# Patient Record
Sex: MALE | Race: BLACK OR AFRICAN AMERICAN | NOT HISPANIC OR LATINO | Employment: UNEMPLOYED | URBAN - METROPOLITAN AREA
[De-identification: names, ages, dates, MRNs, and addresses within clinical notes are randomized per-mention and may not be internally consistent; named-entity substitution may affect disease eponyms.]

---

## 2022-03-30 ENCOUNTER — TELEPHONE (OUTPATIENT)
Dept: ORTHOPEDICS | Facility: CLINIC | Age: 42
End: 2022-03-30
Payer: MEDICAID

## 2022-04-18 DIAGNOSIS — M25.551 RIGHT HIP PAIN: Primary | ICD-10-CM

## 2022-04-20 ENCOUNTER — HOSPITAL ENCOUNTER (OUTPATIENT)
Dept: RADIOLOGY | Facility: HOSPITAL | Age: 42
Discharge: HOME | End: 2022-04-20
Attending: NURSE PRACTITIONER
Payer: MEDICAID

## 2022-04-20 DIAGNOSIS — M25.551 RIGHT HIP PAIN: ICD-10-CM

## 2022-04-20 PROCEDURE — 73521 X-RAY EXAM HIPS BI 2 VIEWS: CPT | Mod: TC,PO

## 2022-05-06 DIAGNOSIS — M25.859 FEMORAL ACETABULAR IMPINGEMENT: Primary | ICD-10-CM

## 2022-05-06 DIAGNOSIS — M70.62 TROCHANTERIC BURSITIS OF LEFT HIP: ICD-10-CM

## 2022-05-17 ENCOUNTER — CLINICAL SUPPORT (OUTPATIENT)
Dept: REHABILITATION | Facility: HOSPITAL | Age: 42
End: 2022-05-17
Attending: NURSE PRACTITIONER
Payer: MEDICAID

## 2022-05-17 DIAGNOSIS — M25.551 RIGHT HIP PAIN: ICD-10-CM

## 2022-05-17 DIAGNOSIS — R53.1 WEAKNESS: ICD-10-CM

## 2022-05-17 DIAGNOSIS — Z74.09 DECREASED FUNCTIONAL MOBILITY AND ENDURANCE: ICD-10-CM

## 2022-05-17 DIAGNOSIS — Z55.9 SPECIAL EDUCATIONAL NEEDS: ICD-10-CM

## 2022-05-17 DIAGNOSIS — M25.552 LEFT HIP PAIN: Primary | ICD-10-CM

## 2022-05-17 DIAGNOSIS — M62.89 ABNORMAL INCREASED MUSCLE TONE: ICD-10-CM

## 2022-05-17 PROCEDURE — 97110 THERAPEUTIC EXERCISES: CPT | Mod: PN

## 2022-05-17 PROCEDURE — 97162 PT EVAL MOD COMPLEX 30 MIN: CPT | Mod: PN

## 2022-05-17 SDOH — SOCIAL DETERMINANTS OF HEALTH (SDOH): PROBLEMS RELATED TO EDUCATION AND LITERACY, UNSPECIFIED: Z55.9

## 2022-05-18 NOTE — PLAN OF CARE
[facility]   Physical Therapy Initial Evaluation     Date: 5/17/2022   Name: Sapphire Silva  Clinic Number: [id number]    Therapy Diagnosis:   Encounter Diagnoses   Name Primary?    Left hip pain Yes    Right hip pain     Weakness     Abnormal increased muscle tone     Decreased functional mobility and endurance     Special educational needs      Physician: Marcela Ramirez, INGR[facility]    Physician Orders: PT Eval and Treat   Medical Diagnosis from Referral: M25.859 (ICD-10-CM) - Femoral acetabular impingement M70.62 (ICD-10-CM) - Trochanteric bursitis of left hip   Evaluation Date: 5/17/2022  Authorization Period Expiration: 06/05/2022  Plan of Care Expiration: 07/08/2022 at 2x/wk x 6 weeks.  Progress Note Due: 06/17/2022  Visit # / Visits authorized: 1/ 1  FOTO: 1/ 3    Time In: 1:15 pm  Time Out: 2:25 pm  Total Appointment Time (timed & untimed codes): 70 minutes    Precautions: Standard  SUBJECTIVE   Date of onset: The patient reports that his current complaints are approximately six months in length.    History of current condition - Sapphire reports: that his current complaints came out of no where. He recalls no mechanism of injury. He has about a six month history of his current complaints. Originally, his right hip started hurting. He thought he had a kale problem, so he went to a chiropractor. He was set up for an 18 visit plan of care. He reports that on the 15th visit the chiropractor adjusted the left hip, and he has had left hip pain ever since. He reports the left hip pain is now worse then the right side pain. His pain persisted, so he went to an orthopedic Dr a few weeks ago. He got X-rays taken. The X-rays revealed a cyst on the right femur. Left hip and femur looked good. He got an injection in his left hip that he believes was to help the left hip bursitis that had developed. He did complete his 18 visit chiropractor plan of care as he reports that he still had  "hope he would improve. However, he did not. "She didn't even take x-rays." He was told that his left hip may have started hurting due to compensation for the previous right hip pain. Patient is left handed and left side dominant. He has had no previous hip problems and no childhood bone diseases or growth plate issues. He had not changed how he was working out at the time of the original injury. He is a , but he reports that he is having to be careful at this time. 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 He returns to [facility]. 06/04/2022.    Falls: He reports no falls.    Imaging, CLINICAL HISTORY: 41 years Male Pain for a couple of months; no trauma; no surgery; pt is ; he stated heavy wear and tear on his joints. COMPARISON: None. FINDINGS: No acute fracture or malalignment of the pelvis or hips. Small subchondral cyst along the superior lateral aspect of the right femoral head. Femoral acetabular joint spaces are maintained. Pubic symphysis and SI joints are intact. Soft tissues are unremarkable. IMPRESSION: No acute osseous abnormality. Small subchondral cyst of the right femoral head which could reflect developing osteoarthrosis/femoroacetabular impingement.     Prior Therapy: The patient has only been treated by a chiropractor. He has not received PT for this diagnosis.  Social History: Sapphire lives with his family.  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  Prior Level of Function: The patient reports that he had no limitations prior to these current complaints.  Current Level of Function: He reports that he has to do more verbal demonstrations on his job as opposed to physical demonstrations.    Pain:  Current 5/10, worst 6/10, best 5/10   Location: Left hip greater then right hip and pain runs posteriorly to sacroiliac joint area.  Description: deep, ache, throbbing, and pressure.  Aggravating Factors: Sitting " "on a hard surface,   Easing Factors: Sitting on a soft cushion or sofa and changing positions.    Patients goals: "I want to be pain free again."     Medical History:   No past medical history on file.    Surgical History:   Sapphire Silva  has no past surgical history on file.    Medications:   Sapphire currently has no medications in their medication list.    Allergies:   Review of patient's allergies indicates:  Not on File     OBJECTIVE     Hip    Impaired Hip: Left greater then Right     Structural/Postural Inspection/Palpation:       Palpation tenderness to: Tenderness to palpation to bilateral sacroiliac joints and bilateral piriformis muscles. More tenderness reported in the left side verses the right side. Bilateral anterior superior iliac spines and bilateral posterior superior iliac spines are even. Iliac crests are even. Patient appears with decreased thoracic mobility including rotation.    Tone    Muscle Left Right Comment   Piriformis Minimal Increase Minimal Increase    Quadriceps Normal Normal    Hamstrings Minimal Increase Minimal Increase    Illiopsoas Normal Normal        ROM       Hip AROM AROM Comment    Left Right          Flexion WFL* WFL*    Extension WFL* WFL*    ABduction WFL* WFL*    ADDuction WFL* WFL*    IR 30* 40*    ER 25* 30*        MMT      Hip   Comment    Left Right          Flexion 5/5 5/5    Extension 4+/5 5/5    ABduction 4+/5 5/5    ADduction 5/5 5/5    Internal Rotation 4-/5 4+/5    External Rotation 4-/5 4+/5    Glut Max 4/5 5/5                Knee Flex 5/5 5/5    Knee ext 5/5 5/5    DF 5/5 5/5               SPECIAL TESTS       Hip   Comment    Left Right          Luis Test(iliopsoas) Positive. Positive.    Elicia Test(IT band) Negative. Negative.    Trendelenburg Test(hip dysf: weak gluts) Negative. Negative.    FADIR Positive. Negative.    Fabers test(hip, lumbar,SI jt dysf) Positive. Negative.       FUNCTIONAL MOBILITY      Balance: No gross abnormalities.      Gait: " Normal        Limitation/Restriction for FOTO hip Survey    Therapist reviewed FOTO scores for Sapphire Silva on 5/17/2022.   FOTO documents entered into [employer] - see Media section.    Limitation Score: 37% limitation at evaluation.         TREATMENT     Total Treatment time (time-based codes) separate from Evaluation: 10 minutes      Sapphire received the treatments listed below:    -kneeling iliopsoas stretch with 30 second hold x 3  -groin stretch with 30 second hold x 3  -bilateral side lying with hip external rotation x 20 each side.    PATIENT EDUCATION AND HOME EXERCISES     Education provided:   -The patient received his initial written home exercise program. He returned demo to PT and was without questions.    Written Home Exercises Provided: yes. Exercises were reviewed and Sapphire was able to demonstrate them prior to the end of the session.  Sapphire demonstrated good  understanding of the education provided. See EMR under Patient Instructions for exercises provided during therapy sessions.    ASSESSMENT     Sapphire is a 41 y.o. male referred to outpatient Physical Therapy with a medical diagnosis of M25.859 (ICD-10-CM) - Femoral acetabular impingement M70.62 (ICD-10-CM) - Trochanteric bursitis of left hip. Patient presents with bilateral with left greater then right hip pain and muscle weakness and decreased functional ability and needing patient education and a custom home program.    Patient prognosis is Excellent.   Patientt will benefit from skilled outpatient Physical Therapy to address the deficits stated above and in the chart below, provide patient /family education, and to maximize patientt's level of independence.     Plan of care discussed with patient: Yes  Patient's spiritual, cultural and educational needs considered and patient is agreeable to the plan of care and goals as stated below:     Anticipated Barriers for therapy: none    Medical Necessity is demonstrated by the  following  History  Co-morbidities and personal factors that may impact the plan of care Co-morbidities:   coping style/mechanism, level of undertstanding of current condition and muscle tone and pain control.    Personal Factors:   coping style  lifestyle     moderate   Examination  Body Structures and Functions, activity limitations and participation restrictions that may impact the plan of care Body Regions:   back  lower extremities  trunk    Body Systems:    gross symmetry  strength  gross coordinated movement  motor control  motor learning  increased muscle tone and pain control    Participation Restrictions:   No restrictions noted.    Activity limitations:   Learning and applying knowledge  no deficits    General Tasks and Commands  no deficits    Communication  no deficits    Mobility  lifting and carrying objects    Self care  looking after one's health    Domestic Life  doing house work (cleaning house, washing dishes, laundry)  assisting others    Interactions/Relationships  no deficits    Life Areas  employment    Community and Social Life  community life  recreation and leisure         moderate   Clinical Presentation evolving clinical presentation with changing clinical characteristics moderate   Decision Making/ Complexity Score: moderate     Goals:    STG  Weeks/Visits Date Established  Date Met   1.Decrease max bilateral hip pain to 4/10 to improve the patient's quality of life. 3 weeks. 5/17/2022      2. Increase left hip strength 1/2 grade to improve standing activity endurance. 3 weeks. 5/17/2022      3.Decrease FOTO limitation score to <=30% to improve his job task ability. 3 weeks. 5/17/2022      4.Fair initial written home exercise program knowledge and be without questions to have carryover after discharge from PT. 3 weeks. 5/17/2022        LTG Weeks/Visits Date Established  Date Met   1.Decrease max bilateral hip pain to 2/10 to improve the patient's quality of life. 6 weeks. 5/17/2022       2. Increase left hip strength one grade from evaluation date to improve standing activity endurance. 6 weeks. 5/17/2022        3.Decrease FOTO limitation score to <=25% to improve his job task ability. 6 weeks. 5/17/2022      4.Fair initial written home exercise program knowledge and be without questions to have carryover after discharge from PT. 6 weeks. 5/17/2022      5.Decrease bilateral piriformis and hamstring muscle tone to normal to minimal increase to improve squatting ability. 6 weeks. 5/17/2022          PLAN   Plan of care Certification: 5/17/2022 to 07/08/2022.    Outpatient Physical Therapy 2 times weekly for 6 weeks to include the following interventions: Cervical/Lumbar Traction, Electrical Stimulation unattended., Manual Therapy, Moist Heat/ Ice, Neuromuscular Re-ed, Orthotic Management and Training, Patient Education, Self Care, Therapeutic Activities, Therapeutic Exercise and care by a PTA..     Buddy Issa, PT      I CERTIFY THE NEED FOR THESE SERVICES FURNISHED UNDER THIS PLAN OF TREATMENT AND WHILE UNDER MY CARE   Physician's comments:     Physician's Signature: ___________________________________________________

## 2022-05-19 ENCOUNTER — CLINICAL SUPPORT (OUTPATIENT)
Dept: REHABILITATION | Facility: HOSPITAL | Age: 42
End: 2022-05-19
Attending: NURSE PRACTITIONER
Payer: MEDICAID

## 2022-05-19 DIAGNOSIS — Z55.9 SPECIAL EDUCATIONAL NEEDS: ICD-10-CM

## 2022-05-19 DIAGNOSIS — Z74.09 DECREASED FUNCTIONAL MOBILITY AND ENDURANCE: ICD-10-CM

## 2022-05-19 DIAGNOSIS — M25.552 LEFT HIP PAIN: Primary | ICD-10-CM

## 2022-05-19 DIAGNOSIS — M25.551 RIGHT HIP PAIN: ICD-10-CM

## 2022-05-19 DIAGNOSIS — R53.1 WEAKNESS: ICD-10-CM

## 2022-05-19 DIAGNOSIS — M62.89 ABNORMAL INCREASED MUSCLE TONE: ICD-10-CM

## 2022-05-19 PROCEDURE — 97110 THERAPEUTIC EXERCISES: CPT | Mod: PN,CQ

## 2022-05-19 SDOH — SOCIAL DETERMINANTS OF HEALTH (SDOH): PROBLEMS RELATED TO EDUCATION AND LITERACY, UNSPECIFIED: Z55.9

## 2022-05-19 NOTE — PROGRESS NOTES
[facility]   Physical Therapy Treatment Note     Name: Sapphire Silva  Clinic Number: [id number]    Therapy Diagnosis:   Encounter Diagnoses   Name Primary?    Left hip pain Yes    Right hip pain     Weakness     Abnormal increased muscle tone     Decreased functional mobility and endurance     Special educational needs      Physician: Marcela Ramirez FNP-C    Visit Date: 5/19/2022    Physician Orders: PT Eval and Treat   Medical Diagnosis from Referral: M25.859 (ICD-10-CM) - Femoral acetabular impingement M70.62 (ICD-10-CM) - Trochanteric bursitis of left hip   Evaluation Date: 5/17/2022  Authorization Period Expiration: 06/05/2022  Plan of Care Expiration: 07/08/2022 at 2x/wk x 6 weeks.  Progress Note Due: 06/17/2022  Visit # / Visits authorized: 1/ 20  FOTO: 1/ 3    PTA Visit #: 1/5     Time In: 1345  Time Out: 1430  Total Billable Time: 45 minutes    SUBJECTIVE     Pt reports: minimal pain in his left hip. Experienced some anterior hip pinching with hip flexor stretch.     He was compliant with home exercise program.  Response to previous treatment: first visit post initial evaluation   Functional change: non-remarkable    Pain: 3/10  Location: left hip      OBJECTIVE     Objective Measures updated at progress report unless specified.     Treatment     Sapphire received the treatments listed below:      therapeutic exercises to develop strength, endurance, ROM, flexibility and posture for 15 minutes including:    Dorsiflexion mobilization with Black theraband x 15 repetitions   Half kneeling dorsiflexion with 10 pounds weight x 20 repetitions   Open book x 15 repetitions bilaterally   Thread needle x 15 repetitions bilaterally   Supine thoracic extension over bolster x 15 repetitions   Prone hip extension with abduction/adduction 2 x 10 repetitions bilaterally     manual therapy techniques: Joint mobilizations were applied to the: left hip for 10 minutes,  including:    Caudal glides to left hip grade III   Lateral distraction to left hip  Posterior to anterior joint mobs to left hip grade III     neuromuscular re-education activities to improve: Coordination, Kinesthetic, Sense, Proprioception and Posture for 20 minutes. The following activities were included:    Half kneeling in tandem on foam x 30 seconds bilaterally then x 1 minute bilaterally   Half kneeling in tandem on foam paloff press with green theraband x 20 repetitions bilaterally   Half kneeling in tandem on foam chops and lifts with green theraband x 15 repetitions each and bilaterally   Supine balance on foam feet together x 1 mintue  Glute bridges on the wall 3 x 30 seconds  Glute bridges on wall alternating foot lift off 2 x 10 repetitions    Focusing on preventing hip flexor activation            Patient Education and Home Exercises     Home Exercises Provided and Patient Education Provided     Education provided:   - stop exercise if he experiences symptomology in his hips    Written Home Exercises Provided: yes. Exercises were reviewed and Sapphire was able to demonstrate them prior to the end of the session.  Sapphire demonstrated good  understanding of the education provided. See EMR under Patient Instructions for exercises provided during therapy sessions    ASSESSMENT     Pt tolerated first treatment post initial evaluation well. He was able to perform all recommended there-ex without reports of provocation of pain. He responded well to manual with a reduction in anterior hip symptomology post. Todays treatment was focused on isolating transverse abdominus and glutes to improve hip strength. Patient req'd cueing to improve form and execution of several exercises as expected. He responded well to all therapist instruction with improvement noted. Patient will continue to benefit from skilled Physical Therapy to improve strength and flexibility deficits to allow patient to return to prior level of  function without pain or limitations.       Sapphire Is progressing well towards his goals.   Pt prognosis is Excellent.     Pt will continue to benefit from skilled outpatient physical therapy to address the deficits listed in the problem list box on initial evaluation, provide pt/family education and to maximize pt's level of independence in the home and community environment.     Pt's spiritual, cultural and educational needs considered and pt agreeable to plan of care and goals.     Anticipated barriers to physical therapy: none    Goals:       STG  Weeks/Visits Date Established  Date Met   1.Decrease max bilateral hip pain to 4/10 to improve the patient's quality of life. 3 weeks. 5/17/2022     In progress 5/19/2022      2. Increase left hip strength 1/2 grade to improve standing activity endurance. 3 weeks. 5/17/2022    In progress 5/19/2022      3.Decrease FOTO limitation score to <=30% to improve his job task ability. 3 weeks. 5/17/2022    In progress 5/19/2022      4.Fair initial written home exercise program knowledge and be without questions to have carryover after discharge from PT. 3 weeks. 5/17/2022    In progress 5/19/2022         LTG Weeks/Visits Date Established  Date Met   1.Decrease max bilateral hip pain to 2/10 to improve the patient's quality of life. 6 weeks. 5/17/2022    In progress 5/19/2022      2. Increase left hip strength one grade from evaluation date to improve standing activity endurance. 6 weeks. 5/17/2022       In progress 5/19/2022      3.Decrease FOTO limitation score to <=25% to improve his job task ability. 6 weeks. 5/17/2022    In progress 5/19/2022      4.Fair initial written home exercise program knowledge and be without questions to have carryover after discharge from PT. 6 weeks. 5/17/2022    In progress 5/19/2022      5.Decrease bilateral piriformis and hamstring muscle tone to normal to minimal increase to improve squatting ability. 6 weeks. 5/17/2022    In progress  5/19/2022             PLAN     Progress hip stability exercises per patients tolerance    Suze Trinidad, PTA

## 2022-05-27 ENCOUNTER — DOCUMENTATION ONLY (OUTPATIENT)
Dept: REHABILITATION | Facility: HOSPITAL | Age: 42
End: 2022-05-27

## 2022-05-27 ENCOUNTER — CLINICAL SUPPORT (OUTPATIENT)
Dept: REHABILITATION | Facility: HOSPITAL | Age: 42
End: 2022-05-27
Attending: NURSE PRACTITIONER
Payer: MEDICAID

## 2022-05-27 DIAGNOSIS — Z74.09 DECREASED FUNCTIONAL MOBILITY AND ENDURANCE: ICD-10-CM

## 2022-05-27 DIAGNOSIS — Z55.9 SPECIAL EDUCATIONAL NEEDS: ICD-10-CM

## 2022-05-27 DIAGNOSIS — R53.1 WEAKNESS: ICD-10-CM

## 2022-05-27 DIAGNOSIS — M25.552 LEFT HIP PAIN: Primary | ICD-10-CM

## 2022-05-27 DIAGNOSIS — M62.89 ABNORMAL INCREASED MUSCLE TONE: ICD-10-CM

## 2022-05-27 DIAGNOSIS — M25.551 RIGHT HIP PAIN: ICD-10-CM

## 2022-05-27 PROCEDURE — 97110 THERAPEUTIC EXERCISES: CPT | Mod: PN,CQ

## 2022-05-27 SDOH — SOCIAL DETERMINANTS OF HEALTH (SDOH): PROBLEMS RELATED TO EDUCATION AND LITERACY, UNSPECIFIED: Z55.9

## 2022-05-27 NOTE — PROGRESS NOTES
[facility]   Physical Therapy Treatment Note     Name: Sapphire Silva  Clinic Number: [id number]    Therapy Diagnosis:   Encounter Diagnoses   Name Primary?    Left hip pain Yes    Right hip pain     Weakness     Abnormal increased muscle tone     Decreased functional mobility and endurance     Special educational needs      Physician: Marcela Ramirez FNP-C    Visit Date: 5/27/2022    Physician Orders: PT Eval and Treat   Medical Diagnosis from Referral: M25.859 (ICD-10-CM) - Femoral acetabular impingement M70.62 (ICD-10-CM) - Trochanteric bursitis of left hip   Evaluation Date: 5/17/2022  Authorization Period Expiration: 06/05/2022  Plan of Care Expiration: 07/08/2022 at 2x/wk x 6 weeks.  Progress Note Due: 06/17/2022  Visit # / Visits authorized: 2/ 20  FOTO: 1/ 3    PTA Visit #: 2/5     Time In: 1200  Time Out: 1245  Total Billable Time: 45 minutes    SUBJECTIVE     Pt reports: His hips haven't been bothering him as much since his last treatment session. He reports he is experiencing more sacroiliac pain this date. Post treatment session patient verbalized a reduction in symptomology to a 1-2/10.     He was compliant with home exercise program.  Response to previous treatment: positive   Functional change: non-remarkable    Pain: 4/10  Location: left hip      OBJECTIVE     Objective Measures updated at progress report unless specified.     Treatment     Sapphire received the treatments listed below:      therapeutic exercises to develop strength, endurance, ROM, flexibility and posture for 15 minutes including:    Dorsiflexion mobilization with Black theraband x 15 repetitions   Half kneeling dorsiflexion with 10 pounds weight x 20 repetitions   Open book x 10 repetitions bilaterally add theraband next visit  Thread needle with reverse rotation x 10 repetitions bilaterally   Prone hip extension with abduction/adduction 2 x 10 repetitions bilaterally     manual therapy  techniques: Joint mobilizations were applied to the: left hip for 10 minutes, including:    Caudal glides to left hip grade III   Lateral distraction to left hip  Posterior to anterior joint mobs to left hip grade III     neuromuscular re-education activities to improve: Coordination, Kinesthetic, Sense, Proprioception and Posture for 20 minutes. The following activities were included:    +Prone glute sets with 10 lb plate on his back for biofeed back to prevent paraspinal compensation 10 seconds holds x 3 minutes   +Quadruped over physioball donkey kicks with knee flexion 2 x 10 repetitions  +standing on airex in single leg stance moving weight around his body clock wise x 5 repetitions and counter clock wise x 5 repetitions   -Standing pushing hip into wall (glute med activation on stabilizing leg) performing hip external rotation (with hip flexed to 90 degrees) against the wall with opposite leg  x 15 repetitions bilaterally      Not performed:   Half kneeling in tandem on foam x 30 seconds bilaterally then x 1 minute bilaterally   Half kneeling in tandem on foam paloff press with green theraband x 20 repetitions bilaterally   Half kneeling in tandem on foam chops and lifts with green theraband x 15 repetitions each and bilaterally   Supine balance on foam feet together x 1 mintue  Glute bridges on the wall 3 x 30 seconds  Glute bridges on wall alternating foot lift off 2 x 10 repetitions    Focusing on preventing hip flexor activation            Patient Education and Home Exercises     Home Exercises Provided and Patient Education Provided     Education provided:   - stop exercise if he experiences symptomology in his hips    Written Home Exercises Provided: yes. Exercises were reviewed and Sapphire was able to demonstrate them prior to the end of the session.  Sapphire demonstrated good  understanding of the education provided. See EMR under Patient Instructions for exercises provided during therapy  sessions    ASSESSMENT     Patient continues to respond well to mobs to improve mobility. Treatment is then focused on improving hip intrinsic and extrinsic strengthening. He notes with some ankle weakness on left lower extremity versus right lower extremity evident during single leg stance exercise. Physical Therapy will continue to benefit from skilled Physical Therapy to improve joint mobility to allow him to improve strength with activities of daily living.        Sapphire Is progressing well towards his goals.   Pt prognosis is Excellent.     Pt will continue to benefit from skilled outpatient physical therapy to address the deficits listed in the problem list box on initial evaluation, provide pt/family education and to maximize pt's level of independence in the home and community environment.     Pt's spiritual, cultural and educational needs considered and pt agreeable to plan of care and goals.     Anticipated barriers to physical therapy: none    Goals:       STG  Weeks/Visits Date Established  Date Met   1.Decrease max bilateral hip pain to 4/10 to improve the patient's quality of life. 3 weeks. 5/17/2022     In progress 5/27/2022      2. Increase left hip strength 1/2 grade to improve standing activity endurance. 3 weeks. 5/17/2022    In progress 5/27/2022      3.Decrease FOTO limitation score to <=30% to improve his job task ability. 3 weeks. 5/17/2022    In progress 5/27/2022      4.Fair initial written home exercise program knowledge and be without questions to have carryover after discharge from PT. 3 weeks. 5/17/2022    In progress 5/27/2022         LTG Weeks/Visits Date Established  Date Met   1.Decrease max bilateral hip pain to 2/10 to improve the patient's quality of life. 6 weeks. 5/17/2022    In progress 5/27/2022      2. Increase left hip strength one grade from evaluation date to improve standing activity endurance. 6 weeks. 5/17/2022       In progress 5/27/2022      3.Decrease FOTO limitation  score to <=25% to improve his job task ability. 6 weeks. 5/17/2022    In progress 5/27/2022      4.Fair initial written home exercise program knowledge and be without questions to have carryover after discharge from PT. 6 weeks. 5/17/2022    In progress 5/27/2022      5.Decrease bilateral piriformis and hamstring muscle tone to normal to minimal increase to improve squatting ability. 6 weeks. 5/17/2022    In progress 5/27/2022             PLAN     Progress hip stability exercises per patients tolerance    Suze Trinidad, PTA

## 2022-05-31 ENCOUNTER — CLINICAL SUPPORT (OUTPATIENT)
Dept: REHABILITATION | Facility: HOSPITAL | Age: 42
End: 2022-05-31
Attending: NURSE PRACTITIONER
Payer: MEDICAID

## 2022-05-31 DIAGNOSIS — M25.552 LEFT HIP PAIN: Primary | ICD-10-CM

## 2022-05-31 DIAGNOSIS — R53.1 WEAKNESS: ICD-10-CM

## 2022-05-31 DIAGNOSIS — Z55.9 SPECIAL EDUCATIONAL NEEDS: ICD-10-CM

## 2022-05-31 DIAGNOSIS — M62.89 ABNORMAL INCREASED MUSCLE TONE: ICD-10-CM

## 2022-05-31 DIAGNOSIS — M25.551 RIGHT HIP PAIN: ICD-10-CM

## 2022-05-31 DIAGNOSIS — Z74.09 DECREASED FUNCTIONAL MOBILITY AND ENDURANCE: ICD-10-CM

## 2022-05-31 PROCEDURE — 97110 THERAPEUTIC EXERCISES: CPT | Mod: PN,CQ

## 2022-05-31 SDOH — SOCIAL DETERMINANTS OF HEALTH (SDOH): PROBLEMS RELATED TO EDUCATION AND LITERACY, UNSPECIFIED: Z55.9

## 2022-05-31 NOTE — PROGRESS NOTES
"[facility]   Physical Therapy Treatment Note     Name: Sapphire Silva  Clinic Number: [id number]    Therapy Diagnosis:   Encounter Diagnoses   Name Primary?    Left hip pain Yes    Right hip pain     Weakness     Abnormal increased muscle tone     Decreased functional mobility and endurance     Special educational needs      Physician: Marcela Ramirez FNP-C    Visit Date: 5/31/2022    Physician Orders: PT Eval and Treat   Medical Diagnosis from Referral: M25.859 (ICD-10-CM) - Femoral acetabular impingement M70.62 (ICD-10-CM) - Trochanteric bursitis of left hip   Evaluation Date: 5/17/2022  Authorization Period Expiration: 06/05/2022  Plan of Care Expiration: 07/08/2022 at 2x/wk x 6 weeks.  Progress Note Due: 06/17/2022  Visit # / Visits authorized: 3/ 20  FOTO: 1/ 3    PTA Visit #: 3/5     Time In: 1300  Time Out: 1345  Total Billable Time: 45 minutes    SUBJECTIVE     Pt reports: His pain is no longer constant. It now "comes and goes." Sitting down on a hard surface will provoke pain with a little bit in his hips but mainly in his SIJ region.   He was compliant with home exercise program.  Response to previous treatment: positive   Functional change: non-remarkable    Pain: 2/10  Location: left hip      OBJECTIVE     Objective Measures updated at progress report unless specified.     Treatment     Sapphire received the treatments listed below:      therapeutic exercises to develop strength, endurance, ROM, flexibility and posture for 15 minutes including:    Dorsiflexion mobilization with Black theraband x 15 repetitions   Half kneeling dorsiflexion with 10 pounds weight x 20 repetitions   Open book x 10 repetitions bilaterally red theraband   Thread needle with reverse rotation x 10 repetitions bilaterally       manual therapy techniques: Joint mobilizations were applied to the: bilateral hips for 15 minutes, including:    Caudal glides to left hip grade III   Lateral " distraction to left hip  Posterior to anterior joint mobs to left hip grade III     neuromuscular re-education activities to improve: Coordination, Kinesthetic, Sense, Proprioception and Posture for 20 minutes. The following activities were included:    -Skaters lunge into hip hike x 15 repetitions bilaterally  -Sideling flexion into abduction 2 x 15 repetitions    -Standing pushing hip into wall (glute med activation on stabilizing leg) performing hip external rotation (with hip flexed to 90 degrees) against the wall with opposite leg  x 15 repetitions bilaterally  -deep 6 isometrics in quadruped 5 seconds holds x 15 repetitions    With focus on preventing hip flexor from turning on  -Sidelying external rotation off the mat 2 x 15 repetitions   -Sidelying plank external rotation 2 x 10 repetitions with red theraband            Patient Education and Home Exercises     Home Exercises Provided and Patient Education Provided     Education provided:   - stop exercise if he experiences symptomology in his hips    Written Home Exercises Provided: yes. Exercises were reviewed and Sapphire was able to demonstrate them prior to the end of the session.  Sapphire demonstrated good  understanding of the education provided. See EMR under Patient Instructions for exercises provided during therapy sessions    ASSESSMENT     Patient continues to respond well to mobs to improve mobility with complete reduction in symptomology and ability to sit on hard surface without return of SIJ symptomology. Focused on hip intrinsic strengthening this date with good response noted. Patient will continue to benefit from skilled Physical Therapy to improve strength and mobility to allow him to return to all activities without onset of symptomology.         Sapphire Is progressing well towards his goals.   Pt prognosis is Excellent.     Pt will continue to benefit from skilled outpatient physical therapy to address the deficits listed in the problem list  box on initial evaluation, provide pt/family education and to maximize pt's level of independence in the home and community environment.     Pt's spiritual, cultural and educational needs considered and pt agreeable to plan of care and goals.     Anticipated barriers to physical therapy: none    Goals:       STG  Weeks/Visits Date Established  Date Met   1.Decrease max bilateral hip pain to 4/10 to improve the patient's quality of life. 3 weeks. 5/17/2022     In progress 5/31/2022      2. Increase left hip strength 1/2 grade to improve standing activity endurance. 3 weeks. 5/17/2022    In progress 5/31/2022      3.Decrease FOTO limitation score to <=30% to improve his job task ability. 3 weeks. 5/17/2022    In progress 5/31/2022      4.Fair initial written home exercise program knowledge and be without questions to have carryover after discharge from PT. 3 weeks. 5/17/2022    In progress 5/31/2022         LTG Weeks/Visits Date Established  Date Met   1.Decrease max bilateral hip pain to 2/10 to improve the patient's quality of life. 6 weeks. 5/17/2022    In progress 5/31/2022      2. Increase left hip strength one grade from evaluation date to improve standing activity endurance. 6 weeks. 5/17/2022       In progress 5/31/2022      3.Decrease FOTO limitation score to <=25% to improve his job task ability. 6 weeks. 5/17/2022    In progress 5/31/2022      4.Fair initial written home exercise program knowledge and be without questions to have carryover after discharge from PT. 6 weeks. 5/17/2022    In progress 5/31/2022      5.Decrease bilateral piriformis and hamstring muscle tone to normal to minimal increase to improve squatting ability. 6 weeks. 5/17/2022    In progress 5/31/2022             PLAN     Progress hip stability exercises per patients tolerance    Suze Trinidad, PTA

## 2022-06-07 ENCOUNTER — CLINICAL SUPPORT (OUTPATIENT)
Dept: REHABILITATION | Facility: HOSPITAL | Age: 42
End: 2022-06-07
Attending: NURSE PRACTITIONER
Payer: MEDICAID

## 2022-06-07 DIAGNOSIS — M62.89 ABNORMAL INCREASED MUSCLE TONE: ICD-10-CM

## 2022-06-07 DIAGNOSIS — M25.552 LEFT HIP PAIN: Primary | ICD-10-CM

## 2022-06-07 DIAGNOSIS — Z74.09 DECREASED FUNCTIONAL MOBILITY AND ENDURANCE: ICD-10-CM

## 2022-06-07 DIAGNOSIS — M25.551 RIGHT HIP PAIN: ICD-10-CM

## 2022-06-07 DIAGNOSIS — R53.1 WEAKNESS: ICD-10-CM

## 2022-06-07 DIAGNOSIS — Z55.9 SPECIAL EDUCATIONAL NEEDS: ICD-10-CM

## 2022-06-07 PROCEDURE — 97110 THERAPEUTIC EXERCISES: CPT | Mod: PN

## 2022-06-07 SDOH — SOCIAL DETERMINANTS OF HEALTH (SDOH): PROBLEMS RELATED TO EDUCATION AND LITERACY, UNSPECIFIED: Z55.9

## 2022-06-07 NOTE — PROGRESS NOTES
"[facility]   Physical Therapy Treatment Note     Name: Sapphire Silva  Clinic Number: [id number]    Therapy Diagnosis:   Encounter Diagnoses   Name Primary?    Left hip pain Yes    Right hip pain     Weakness     Abnormal increased muscle tone     Decreased functional mobility and endurance     Special educational needs      Physician: Marcela Ramirez FNP-C    Visit Date: 6/7/2022    Physician Orders: PT Eval and Treat   Medical Diagnosis from Referral: M25.859 (ICD-10-CM) - Femoral acetabular impingement M70.62 (ICD-10-CM) - Trochanteric bursitis of left hip   Evaluation Date: 5/17/2022  Authorization Period Expiration: 07/20/2022  Plan of Care Expiration: 07/08/2022 at 2x/wk x 6 weeks.  Progress Note Due: 06/14/2022  Visit # / Visits authorized: 4/ 24  Visits with the evaluation: 5  FOTO: 1/ 3    PTA Visit #: 0/5     Time In: 2:09 pm  Time Out: 3:07 pm  Total Billable Time: 58 minutes    SUBJECTIVE     Pt reports: "I seem to be doing good today. No real hip pain. The hips are much better. I do seem to feel it more now in both of the sacroiliac joints, but it is not pain. I just seem to feel more there now that the hips are better. No current hip pain." PT acknowledges.     He was compliant with home exercise program.    Response to previous treatment: The patient enters today with no complaints of hip pain.   Functional change: The patient reports that he no longer has hip pain with squatting.    Pain: 2/10  Location: left hip      OBJECTIVE     Objective Measures updated at progress report unless specified.     Treatment     Sapphire received the treatments listed below:      therapeutic exercises to develop strength, endurance, ROM, flexibility and posture for 25 minutes including:  -+base only step up thrusts x 10 each leg  -+lateral lunges bilaterally onto gray incline x 20 each  -+red ball wall squats to 90* x 20  -+Gray incline squats to tolerance with 8 pound tbar x " 20  -Half kneeling dorsiflexion with 10 pounds weight x 20 repetitions   -Open book x 10 repetitions bilaterally red theraband on mat with knees at 90 and hips at 90 and squeezing a pillow  -+Paloff's press with upper extremity flexion with blue bandS x 10 each direction  -Thread needle with reverse rotation x 10 repetitions bilaterally NP  -Dorsiflexion mobilization with Black theraband x 15 repetitions NP    manual therapy techniques: Joint mobilizations were applied to the: bilateral hips for 0 minutes, including:  Caudal glides to left hip grade III   Lateral distraction to left hip  Posterior to anterior joint mobs to left hip grade III     neuromuscular re-education activities to improve: Coordination, Kinesthetic, Sense, Proprioception and Posture for 30 minutes. The following activities were included:  -Skaters lunge into hip hike x 15 repetitions bilaterally with two pound ankle weights  -+supine yellow weighted ball adduction with crunch x 20  -+supine single knee to chest stretch bilaterally with 20 second hold x 2 each  -+single knee to chest and opposite leg bridge(Cook hip lift) x 10 each leg  -+step ups on black box level 3 bilaterally x 15 each  -+Piriformis stretch bilaterally with 20 second hold x 1 each  -+Pancake flips with back against the wall x 15  -+prone hip external rotation lift offs x 10 each (with help from PT for form)  -Sidelying external rotation off the mat 2 x 15 repetitions   -+anterior/forward step ups on black box level 3 x 15 each bilaterally  -Standing pushing hip into wall (glute med activation on stabilizing leg) performing hip external rotation (with hip flexed to 90 degrees) against the wall with opposite leg  x 20 repetitions bilaterally with two pound ankle weights  -deep 6 isometrics in quadruped 5 seconds holds x 15 repetitions    With focus on preventing hip flexor from turning on    Below not Performed today:  -Sideling flexion into abduction 2 x 15 repetitions     -Sidelying plank external rotation 2 x 10 repetitions with red theraband     Patient Education and Home Exercises     Home Exercises Provided and Patient Education Provided     Education provided:   - stop exercise if he experiences symptomology in his hips  -+06/07/2020 Updated home exercise program added today. The patient returned demo to PT and was without questions.    Written Home Exercises Provided: yes. Exercises were reviewed and Sapphire was able to demonstrate them prior to the end of the session.  Sapphire demonstrated good  understanding of the education provided. See EMR under Patient Instructions for exercises provided during therapy sessions    ASSESSMENT     Today, the patient continued with his PT plan of care. He is reporting improvements in pain control. Per subjective, PT added some exercises this day for his sacroiliac joints. He required verbal cues but performed them well. Bilateral hip external rotation weakness noted this day. Patient required hands on assist at times for good form. No pain was reported this day but hip muscle fatigue was noted with additional exercises. He can benefit from continued hip mobility where needed. He is working hard. He is compliant with PT. His home program was advanced today. He was kept a little longer then usual today as this PT's next patient had canceled. This allowed for time taken with instructions. He left without questions. Patient will continue to benefit from skilled Physical Therapy to improve strength and mobility to allow him to return to all activities without onset of symptomology.       Sapphire Is progressing well towards his goals.   Pt prognosis is Excellent.     Pt will continue to benefit from skilled outpatient physical therapy to address the deficits listed in the problem list box on initial evaluation, provide pt/family education and to maximize pt's level of independence in the home and community environment.     Pt's spiritual, cultural  and educational needs considered and pt agreeable to plan of care and goals.     Anticipated barriers to physical therapy: none    Goals:     STG  Weeks/Visits Date Established  Date Met   1.Decrease max bilateral hip pain to 4/10 to improve the patient's quality of life. 3 weeks. 5/17/2022     In progress 6/7/2022      2. Increase left hip strength 1/2 grade to improve standing activity endurance. 3 weeks. 5/17/2022    In progress 6/7/2022      3.Decrease FOTO limitation score to <=30% to improve his job task ability. 3 weeks. 5/17/2022    In progress 6/7/2022      4.Fair initial written home exercise program knowledge and be without questions to have carryover after discharge from PT. 3 weeks. 5/17/2022    In progress 6/7/2022         LTG Weeks/Visits Date Established  Date Met   1.Decrease max bilateral hip pain to 2/10 to improve the patient's quality of life. 6 weeks. 5/17/2022    In progress 6/7/2022      2. Increase left hip strength one grade from evaluation date to improve standing activity endurance. 6 weeks. 5/17/2022       In progress 6/7/2022      3.Decrease FOTO limitation score to <=25% to improve his job task ability. 6 weeks. 5/17/2022    In progress 6/7/2022      4.Fair initial written home exercise program knowledge and be without questions to have carryover after discharge from PT. 6 weeks. 5/17/2022    In progress 6/7/2022      5.Decrease bilateral piriformis and hamstring muscle tone to normal to minimal increase to improve squatting ability. 6 weeks. 5/17/2022    In progress 6/7/2022           PLAN     Plan of care Certification: 5/17/2022 to 07/08/2022. Outpatient Physical Therapy 2 times weekly for 6 weeks. Plan to continue to progress hip stability exercises per patients tolerance and improve bilateral hip mobility.     Buddy Issa, PT

## 2022-06-09 ENCOUNTER — CLINICAL SUPPORT (OUTPATIENT)
Dept: REHABILITATION | Facility: HOSPITAL | Age: 42
End: 2022-06-09
Attending: NURSE PRACTITIONER
Payer: MEDICAID

## 2022-06-09 DIAGNOSIS — M25.552 LEFT HIP PAIN: Primary | ICD-10-CM

## 2022-06-09 DIAGNOSIS — M25.551 RIGHT HIP PAIN: ICD-10-CM

## 2022-06-09 DIAGNOSIS — R53.1 WEAKNESS: ICD-10-CM

## 2022-06-09 DIAGNOSIS — M62.89 ABNORMAL INCREASED MUSCLE TONE: ICD-10-CM

## 2022-06-09 DIAGNOSIS — Z74.09 DECREASED FUNCTIONAL MOBILITY AND ENDURANCE: ICD-10-CM

## 2022-06-09 DIAGNOSIS — Z55.9 SPECIAL EDUCATIONAL NEEDS: ICD-10-CM

## 2022-06-09 PROCEDURE — 97110 THERAPEUTIC EXERCISES: CPT | Mod: PN,CQ

## 2022-06-09 SDOH — SOCIAL DETERMINANTS OF HEALTH (SDOH): PROBLEMS RELATED TO EDUCATION AND LITERACY, UNSPECIFIED: Z55.9

## 2022-06-09 NOTE — PROGRESS NOTES
[facility]   Physical Therapy Treatment Note     Name: Sapphire Silva  Clinic Number: [id number]    Therapy Diagnosis:   Encounter Diagnoses   Name Primary?    Left hip pain Yes    Right hip pain     Weakness     Abnormal increased muscle tone     Decreased functional mobility and endurance     Special educational needs      Physician: Marcela Ramirez FNP-C    Visit Date: 6/9/2022    Physician Orders: PT Eval and Treat   Medical Diagnosis from Referral: M25.859 (ICD-10-CM) - Femoral acetabular impingement M70.62 (ICD-10-CM) - Trochanteric bursitis of left hip   Evaluation Date: 5/17/2022  Authorization Period Expiration: 07/20/2022  Plan of Care Expiration: 07/08/2022 at 2x/wk x 6 weeks.  Progress Note Due: 06/14/2022  Visit # / Visits authorized: 5/ 24  Visits with the evaluation: 6  FOTO: 1/ 3    PTA Visit #: 0/5     Time In: 1:00 pm  Time Out: 1:45 pm  Total Billable Time: 45 minutes    SUBJECTIVE     Pt reports: Significant soreness post last treatment session in low back, hip abductors, and hamstrings.      He was compliant with home exercise program.    Response to previous treatment: The patient enters today with no complaints of hip pain.   Functional change: The patient reports that he no longer has hip pain with squatting.    Pain: 0/10  Location: left hip      OBJECTIVE     Objective Measures updated at progress report unless specified.     Treatment     Sapphire received the treatments listed below:      therapeutic exercises to develop strength, endurance, ROM, flexibility and posture for 20 minutes including:    +double knee to chest x 30 repetitions with physio ball  +iron crosses x 10 repetitions bilateral lower extremity     -+base only step up thrusts x 10 each leg  -+lateral lunges bilaterally onto gray incline x 20 each  +body weight squats with glute activation x 15 repetitions   -Quadruped fire hydrants x 20 repetitions   -Quadruped donkey kicks x 20  repetitions   -Quadruped hip abduction with straight leg raise       Not performed:  -+red ball wall squats to 90* x 20  -+Gray incline squats to tolerance with 8 pound tbar x 20  -Thread needle with reverse rotation x 10 repetitions bilaterally NP  -Dorsiflexion mobilization with Black theraband x 15 repetitions NP  -Half kneeling dorsiflexion with 10 pounds weight x 20 repetitions   -Open book x 10 repetitions bilaterally red theraband on mat with knees at 90 and hips at 90 and squeezing a pillow  -Paloff's press with upper extremity flexion with blue bandS x 10 each direction      manual therapy techniques: Joint mobilizations were applied to the: bilateral hips for 5 minutes, including:  Caudal glides to left hip grade III   Lateral distraction to left hip  Posterior to anterior joint mobs to left hip grade III     neuromuscular re-education activities to improve: Coordination, Kinesthetic, Sense, Proprioception and Posture for 20 minutes. The following activities were included:    -supine yellow weighted ball adduction with crunch x 15   Reverse x 15 repetitions   -step ups on black box level 3 bilaterally x 15 each  -Piriformis stretch bilaterally with 20 second hold x 1 each  -Pancake flips with back against the wall x 15  -Sidelying external rotation off the mat 2 x 15 repetitions   -anterior/forward step ups on black box level 3 x 15 each bilaterally  -Standing pushing hip into wall (glute med activation on stabilizing leg) performing hip external rotation (with hip flexed to 90 degrees) against the wall with opposite leg  x 20 repetitions bilaterally with two pound ankle weights  -deep 6 isometrics in quadruped 5 seconds holds x 15 repetitions    With focus on preventing hip flexor from turning on    Below not Performed today:  -supine single knee to chest stretch bilaterally with 20 second hold x 2 each  -single knee to chest and opposite leg bridge(Cook hip lift) x 10 each leg  -prone hip external  rotation lift offs x 10 each (with help from PT for form)  -Sideling flexion into abduction 2 x 15 repetitions    -Sidelying plank external rotation 2 x 10 repetitions with red theraband   -Skaters lunge into hip hike x 15 repetitions bilaterally with two pound ankle weights    Patient Education and Home Exercises     Home Exercises Provided and Patient Education Provided     Education provided:   - stop exercise if he experiences symptomology in his hips  -+06/07/2020 Updated home exercise program added today. The patient returned demo to PT and was without questions.  +6/9/22 Instructed patient to attempt light loaded squat provided he is 100% symptom free and performs proper warm up. Instructed to let therapist know how he tolerated.     Written Home Exercises Provided: yes. Exercises were reviewed and Sapphire was able to demonstrate them prior to the end of the session.  Sapphire demonstrated good  understanding of the education provided. See EMR under Patient Instructions for exercises provided during therapy sessions    ASSESSMENT     Patient responded well to manual again this date as he was able to perform pancake flips without reports of anterior hip pinching. When squatting patient verbalized hamstring soreness; therefore, cueing to activate glutes. Patient verbalized complete reduction of hamstring symptomology with squats as long as he properly activated glutes. Also instructed patient to activate glutes when ever he is experiencing hamstring symptomology to improve motor control. Patient will continue to benefit from skilled Physical Therapy to improve strength deficits in his hips to allow him to return to recreational lifting without pain or limitations.     Sapphire Is progressing well towards his goals.   Pt prognosis is Excellent.     Pt will continue to benefit from skilled outpatient physical therapy to address the deficits listed in the problem list box on initial evaluation, provide pt/family  education and to maximize pt's level of independence in the home and community environment.     Pt's spiritual, cultural and educational needs considered and pt agreeable to plan of care and goals.     Anticipated barriers to physical therapy: none    Goals:     STG  Weeks/Visits Date Established  Date Met   1.Decrease max bilateral hip pain to 4/10 to improve the patient's quality of life. 3 weeks. 5/17/2022     In progress 6/9/2022      2. Increase left hip strength 1/2 grade to improve standing activity endurance. 3 weeks. 5/17/2022    In progress 6/9/2022      3.Decrease FOTO limitation score to <=30% to improve his job task ability. 3 weeks. 5/17/2022    In progress 6/9/2022      4.Fair initial written home exercise program knowledge and be without questions to have carryover after discharge from PT. 3 weeks. 5/17/2022    In progress 6/9/2022         LTG Weeks/Visits Date Established  Date Met   1.Decrease max bilateral hip pain to 2/10 to improve the patient's quality of life. 6 weeks. 5/17/2022    In progress 6/9/2022      2. Increase left hip strength one grade from evaluation date to improve standing activity endurance. 6 weeks. 5/17/2022       In progress 6/9/2022      3.Decrease FOTO limitation score to <=25% to improve his job task ability. 6 weeks. 5/17/2022    In progress 6/9/2022      4.Fair initial written home exercise program knowledge and be without questions to have carryover after discharge from PT. 6 weeks. 5/17/2022    In progress 6/9/2022      5.Decrease bilateral piriformis and hamstring muscle tone to normal to minimal increase to improve squatting ability. 6 weeks. 5/17/2022    In progress 6/9/2022           PLAN     Plan of care Certification: 5/17/2022 to 07/08/2022. Outpatient Physical Therapy 2 times weekly for 6 weeks. Plan to continue to progress hip stability exercises per patients tolerance and improve bilateral hip mobility.     Suze Trinidad, PTA

## 2022-06-14 ENCOUNTER — CLINICAL SUPPORT (OUTPATIENT)
Dept: REHABILITATION | Facility: HOSPITAL | Age: 42
End: 2022-06-14
Attending: NURSE PRACTITIONER
Payer: MEDICAID

## 2022-06-14 DIAGNOSIS — Z74.09 DECREASED FUNCTIONAL MOBILITY AND ENDURANCE: Primary | ICD-10-CM

## 2022-06-14 DIAGNOSIS — M25.552 LEFT HIP PAIN: ICD-10-CM

## 2022-06-14 DIAGNOSIS — M25.551 RIGHT HIP PAIN: ICD-10-CM

## 2022-06-14 DIAGNOSIS — Z55.9 SPECIAL EDUCATIONAL NEEDS: ICD-10-CM

## 2022-06-14 DIAGNOSIS — R53.1 WEAKNESS: ICD-10-CM

## 2022-06-14 DIAGNOSIS — M62.89 ABNORMAL INCREASED MUSCLE TONE: ICD-10-CM

## 2022-06-14 PROCEDURE — 97110 THERAPEUTIC EXERCISES: CPT | Mod: PN

## 2022-06-14 SDOH — SOCIAL DETERMINANTS OF HEALTH (SDOH): PROBLEMS RELATED TO EDUCATION AND LITERACY, UNSPECIFIED: Z55.9

## 2022-06-14 NOTE — PROGRESS NOTES
"[facility]   Physical Therapy Treatment Note/Re-assessment     Name: Sapphire Silva  Clinic Number: [id number]    Therapy Diagnosis:   Encounter Diagnoses   Name Primary?    Left hip pain     Right hip pain     Weakness     Abnormal increased muscle tone     Decreased functional mobility and endurance Yes    Special educational needs      Physician: Marcela Ramirez FNP-DARREL    Visit Date: 6/14/2022    Physician Orders: PT Eval and Treat   Medical Diagnosis from Referral: M25.859 (ICD-10-CM) - Femoral acetabular impingement M70.62 (ICD-10-CM) - Trochanteric bursitis of left hip   Evaluation Date: 5/17/2022  Authorization Period Expiration: 07/20/2022  Plan of Care Expiration: 07/08/2022 at 2x/wk x 6 weeks.  Progress Note Due: 07/08/2022  Visit # / Visits authorized: 6/ 24  Visits with the evaluation: 7  FOTO: 2/ 3 (2nd done 06/14/2022) 3rd needed at discharge    PTA Visit #: 0/5     Time In: 1:08 pm  Time Out: 2:00 pm   Total Billable Time: 40 minutes    SUBJECTIVE     Pt reports: "I am doing good today. I have not had any pain since I was in here last. I even was able to go back into the gym and start to lift weights which is what I wanted to be able to do." PT acknowledges. "I go and get a magnetic resonance image on my right hip this Thursday, so I may have to reschedule." PT acknowledges.     He was compliant with home exercise program.    Response to previous treatment: The patient enters today with no complaints of hip pain.   Functional change: The patient reports he was able to return to the gym to start light work outs.    Pain: 0/10  Location: left hip    OBJECTIVE     Objective Measures updated at progress report unless specified.     Tone     Muscle Left Right Comment   Piriformis Normal to Minimal Increase >Minimal Increase Normal to Minimal Increase >Minimal Increase     Hamstrings Minimal Increase Minimal Increase            MMT        Hip     Comment     Left " Right               Flexion 5/5 5/5     Extension 4+/5 5/5     ABduction 4+/5 5/5     ADduction 5/5 5/5     Internal Rotation 4/5 >4-/5 5-/5 >4+/5     External Rotation 4/5 >4-/5 4+/5     Glut Max 4/5 5/5                     Limitation/Restriction for FOTO hip Survey     Therapist reviewed FOTO scores for Sapphire Silva on 5/17/2022.   FOTO documents entered into [employer] - see Media section.     Limitation Score: 23% limitation (06/14/2022) >37% limitation at evaluation.        Treatment     Saphpire received the treatments listed below for :  40 minutes    therapeutic exercises to develop strength, endurance, ROM, flexibility and posture including:  -double knee to chest to maximum level x 10 repetitions with soccer ball.  -iron crosses x 6 repetitions bilateral lower extremities while on the astroturf  -body weight squats with 8 pound therapy bar with glute activation 2 x 10 repetitions   -+BOSU squats x 25 as low as tolerated with no pain  -+trampoline maximum oscillations (feet don't leave the trampoline) for a one minute repetition x 4 repetitions  -shuttle double leg jumps  -hurdles    Not performed:  -lateral lunges bilaterally onto gray incline x 20 each  -Quadruped fire hydrants x 20 repetitions   -Quadruped donkey kicks x 20 repetitions   -Quadruped hip abduction with straight leg raise   -red ball wall squats to 90* x 20  -Gray incline squats to tolerance with 8 pound tbar x 20  -Open book x 10 repetitions bilaterally red theraband on mat with knees at 90 and hips at 90 and squeezing a pillow  -Paloff's press with upper extremity flexion with blue bandS x 10 each direction    manual therapy techniques: Joint mobilizations were applied to the: bilateral hips for 0 minutes, including:  Caudal glides to left hip grade III   Lateral distraction to left hip  Posterior to anterior joint mobs to left hip grade III     neuromuscular re-education activities to improve: Coordination, Kinesthetic, Sense,  Proprioception and Posture that included:  -supine yellow weighted ball adduction with crunch x 15  -step ups on black box level 3 bilaterally x 15 each  -+Hamstring stretch from chair bilaterally with 30 second hold x 3  -+Prone with knees bent hip extension 2 x 10 each leg  -+green ball overhead presses x 15 lifting body off the wall.  -single knee to chest and opposite leg bridge(Cook hip lift) x 10 each leg  -+squats to tolerance with 8 pound dowel x 25    Below not Performed today:  -Piriformis stretch bilaterally with 20 second hold x 1 each  -Pancake flips with back against the wall x 15  -Sidelying external rotation off the mat 2 x 15 repetitions   -anterior/forward step ups on black box level 3 x 15 each bilaterally  -Standing pushing hip into wall (glute med activation on stabilizing leg) performing hip external rotation (with hip flexed to 90 degrees) against the wall with opposite leg  x 20 repetitions bilaterally with two pound ankle weights  -deep 6 isometrics in quadruped 5 seconds holds x 15 repetitions    With focus on preventing hip flexor from turning on  -prone hip external rotation lift offs x 10 each (with help from PT for form)  -Sideling flexion into abduction 2 x 15 repetitions      Patient Education and Home Exercises     Home Exercises Provided and Patient Education Provided     Education provided:   -stop exercise if he experiences symptomology in his hips  -06/07/2020 Updated home exercise program added today. The patient returned demo to PT and was without questions.  -6/9/22 Instructed patient to attempt light loaded squat provided he is 100% symptom free and performs proper warm up. Instructed to let therapist know how he tolerated.     Written Home Exercises Provided: Patient instructed to cont prior HEP. Exercises were reviewed and Sapphire was able to demonstrate them prior to the end of the session.  Sapphire demonstrated good  understanding of the education provided. See EMR under  Patient Instructions for exercises provided during therapy sessions    RE-ASSESSMENT     Today, the patient was re-assessed. As of today, he has met 3 of his 4 short term goals and was with progression on them all. He has met 2 of his 5 long term goals and has progressed on all. His pain intensity has decreased. His functional abilities are increasing. His pelvis remains level. He needs continued core and posterior hip strengthening as weakness remains. Increased strength should help with full return to desired activity levels. He works hard. He remains with good potential to reach his goals. He is compliant with PT's instructions. PT believes the patient will continue to benefit from skilled Physical Therapy to improve strength deficits in his hips to allow him to return to recreational lifting without pain or limitations.     Sapphire Is progressing well towards his goals.   Pt prognosis is Excellent.     Pt will continue to benefit from skilled outpatient physical therapy to address the deficits listed in the problem list box on initial evaluation, provide pt/family education and to maximize pt's level of independence in the home and community environment.     Pt's spiritual, cultural and educational needs considered and pt agreeable to plan of care and goals.     Anticipated barriers to physical therapy: none    Goals:     STG  Weeks/Visits Date Established  Date Met   1.Decrease max bilateral hip pain to 4/10 to improve the patient's quality of life. 3 weeks. 5/17/2022    Goal Met 6/14/2022      2. Increase left hip strength 1/2 grade to improve standing activity endurance. 3 weeks. 5/17/2022    In progress 6/14/2022      3.Decrease FOTO limitation score to <=30% to improve his job task ability. 3 weeks. 5/17/2022    Goal Met 6/14/2022      4.Fair initial written home exercise program knowledge and be without questions to have carryover after discharge from PT. 3 weeks. 5/17/2022    Goal Met 6/14/2022         LTG  Weeks/Visits Date Established  Date Met   1.Decrease max bilateral hip pain to 2/10 to improve the patient's quality of life. 6 weeks. 5/17/2022    Goal Met 6/14/2022      2. Increase left hip strength one grade from evaluation date to improve standing activity endurance. 6 weeks. 5/17/2022       In progress 6/14/2022      3.Decrease FOTO limitation score to <=25% to improve his job task ability. 6 weeks. 5/17/2022    Goal Met 6/14/2022      4 Good progressed  written home exercise program knowledge and be without questions to have carryover after discharge from PT. 6 weeks. 5/17/2022    In progress 6/14/2022      5.Decrease bilateral piriformis and hamstring muscle tone to normal to minimal increase to improve squatting ability. 6 weeks. 5/17/2022    In progress 6/14/2022           PLAN     Plan of care Certification: 5/17/2022 to 07/08/2022. Outpatient Physical Therapy 2 times weekly for 6 weeks. Plan to continue to progress hip stability exercises per patients tolerance and improve bilateral hip mobility.     Buddy Issa, PT

## 2022-06-21 ENCOUNTER — CLINICAL SUPPORT (OUTPATIENT)
Dept: REHABILITATION | Facility: HOSPITAL | Age: 42
End: 2022-06-21
Attending: NURSE PRACTITIONER
Payer: MEDICAID

## 2022-06-21 DIAGNOSIS — Z74.09 DECREASED FUNCTIONAL MOBILITY AND ENDURANCE: ICD-10-CM

## 2022-06-21 DIAGNOSIS — Z55.9 SPECIAL EDUCATIONAL NEEDS: ICD-10-CM

## 2022-06-21 DIAGNOSIS — R53.1 WEAKNESS: ICD-10-CM

## 2022-06-21 DIAGNOSIS — M62.89 ABNORMAL INCREASED MUSCLE TONE: ICD-10-CM

## 2022-06-21 DIAGNOSIS — M25.552 LEFT HIP PAIN: Primary | ICD-10-CM

## 2022-06-21 DIAGNOSIS — M25.551 RIGHT HIP PAIN: ICD-10-CM

## 2022-06-21 PROCEDURE — 97110 THERAPEUTIC EXERCISES: CPT | Mod: PN

## 2022-06-21 SDOH — SOCIAL DETERMINANTS OF HEALTH (SDOH): PROBLEMS RELATED TO EDUCATION AND LITERACY, UNSPECIFIED: Z55.9

## 2022-06-21 NOTE — PROGRESS NOTES
"[facility]   Physical Therapy Treatment Note    Name: Sapphire Silva  Clinic Number: [id number]    Therapy Diagnosis:   Encounter Diagnoses   Name Primary?    Left hip pain Yes    Right hip pain     Weakness     Abnormal increased muscle tone     Decreased functional mobility and endurance     Special educational needs      Physician: Marcela Ramirez FNP-C    Visit Date: 6/21/2022    Physician Orders: PT Eval and Treat   Medical Diagnosis from Referral: M25.859 (ICD-10-CM) - Femoral acetabular impingement M70.62 (ICD-10-CM) - Trochanteric bursitis of left hip   Evaluation Date: 5/17/2022  Authorization Period Expiration: 07/20/2022  Plan of Care Expiration: 07/08/2022 at 2x/wk x 6 weeks.  Progress Note Due: 07/08/2022  Visit # / Visits authorized: 7/ 24  Visits with the evaluation: 8  FOTO: 2/ 3 (2nd done 06/14/2022) 3rd needed at discharge    PTA Visit #: 0/5     Time In: 1:12 pm  Time Out: 1:56 pm   Total Billable Time: 40 minutes    SUBJECTIVE     Pt reports: "I was able to work out with and squat 135 pounds in the gym. I didn't have any pain afterwards. I still seem to be getting better. It was exciting to get back in the gym. No current pain." PT acknowledges.     He was compliant with home exercise program.    Response to previous treatment: The patient enters today with no complaints of hip pain since the last PT session.   Functional change: The patient was able to perform high level squats with no resulting complaints or pain.    Pain: 0/10  Location: left hip    OBJECTIVE     Objective Measures updated at progress report unless specified.     Treatment     Sapphire received the treatments listed below for : 40 minutes    therapeutic exercises to develop strength, endurance, ROM, flexibility and posture including:  -+sidestepping high hurdles up to maximum speed going both directions x 3 minutes  -Bike on Level x 2 minutes  -lateral lunges bilaterally onto gray incline " x 20 each  -+wide base squats with toes pointed out x 20 with 20 pound medicine ball  -+hip hikes bilaterally on base + one x 20 each  -+single leg bridge bilaterally x 15 each  -+prone with arms at 90* upper body lifts x 10  -+prone with arms at 90* upper body lifts with rotation x 5 each side  -+forward plank on elbows and then shoulder flexion while supported bilaterally x 10 each  -+[place] lifts bilaterally x 10 each  -+banded wall clams  -+Kettle bell hip flexor lifts of the ground bilaterally x 20 each with 10 pound kettle bell  -+knee propped single leg Indonesian dead lift with 20 pound kettle bell x 10 each  -+sidelying circles bilaterally x 10 each  -+hip thrusts on floor using chair x 10  -+shuttle double leg jumps with one black band x 20  -+shuttle single leg jumps with one black band x 20 each leg    Not performed:  -double knee to chest to maximum level x 10 repetitions with soccer ball.  -iron crosses x 6 repetitions bilateral lower extremities while on the astroturf  -body weight squats with 8 pound therapy bar with glute activation 2 x 10 repetitions   -BOSU squats x 25 as low as tolerated with no pain  -trampoline maximum oscillations (feet don't leave the trampoline) for a one minute repetition x 4 repetitions  -Quadruped fire hydrants x 20 repetitions   -Quadruped donkey kicks x 20 repetitions   -Quadruped hip abduction with straight leg raise   -red ball wall squats to 90* x 20  -Gray incline squats to tolerance with 8 pound tbar x 20  -Open book x 10 repetitions bilaterally red theraband on mat with knees at 90 and hips at 90 and squeezing a pillow  -Paloff's press with upper extremity flexion with blue bandS x 10 each direction    manual therapy techniques: Joint mobilizations were applied to the: bilateral hips for 0 minutes, including: NP  Caudal glides to left hip grade III   Lateral distraction to left hip  Posterior to anterior joint mobs to left hip grade III     neuromuscular  re-education activities to improve: Coordination, Kinesthetic, Sense, Proprioception and Posture that included: NP  -supine yellow weighted ball adduction with crunch x 15  -step ups on black box level 3 bilaterally x 15 each  -Hamstring stretch from chair bilaterally with 30 second hold x 3  -Prone with knees bent hip extension 2 x 10 each leg  -green ball overhead presses x 15 lifting body off the wall.  -single knee to chest and opposite leg bridge(Cook hip lift) x 10 each leg  -squats to tolerance with 8 pound dowel x 25    Below not Performed today:  -Piriformis stretch bilaterally with 20 second hold x 1 each  -Pancake flips with back against the wall x 15  -Sidelying external rotation off the mat 2 x 15 repetitions   -anterior/forward step ups on black box level 3 x 15 each bilaterally  -prone hip external rotation lift offs x 10 each (with help from PT for form)  -Sideling flexion into abduction 2 x 15 repetitions      Patient Education and Home Exercises     Home Exercises Provided and Patient Education Provided     Education provided:   -stop exercise if he experiences symptomology in his hips  -06/07/2020 Updated home exercise program added today. The patient returned demo to PT and was without questions.  -6/9/22 Instructed patient to attempt light loaded squat provided he is 100% symptom free and performs proper warm up. Instructed to let therapist know how he tolerated.     Written Home Exercises Provided: Patient instructed to cont prior HEP. Exercises were reviewed and Sapphire was able to demonstrate them prior to the end of the session.  Sapphire demonstrated good  understanding of the education provided. See EMR under Patient Instructions for exercises provided during therapy sessions    ASSESSMENT     Today, the patient enters continuing to report improvements in his functional ability. He had no hip or posterior chain pain as he entered. Exercises were significantly progressed again today. Left  posterior hip and left gluteal muscles required greater concentration to engage as they still appear weaker then the right side. He was able to self adjust and continued with good form. He reported no pain as he left today. He is compliant with PT's instructions. PT believes the patient will continue to benefit from skilled Physical Therapy to improve strength deficits in his hips to allow him to return to recreational lifting without pain or limitations.     Sapphire Is progressing well towards his goals.   Pt prognosis is Excellent.     Pt will continue to benefit from skilled outpatient physical therapy to address the deficits listed in the problem list box on initial evaluation, provide pt/family education and to maximize pt's level of independence in the home and community environment.     Pt's spiritual, cultural and educational needs considered and pt agreeable to plan of care and goals.     Anticipated barriers to physical therapy: none    Goals:     STG  Weeks/Visits Date Established  Date Met   1.Decrease max bilateral hip pain to 4/10 to improve the patient's quality of life. 3 weeks. 5/17/2022    Goal Met 6/21/2022      2. Increase left hip strength 1/2 grade to improve standing activity endurance. 3 weeks. 5/17/2022    In progress 6/21/2022      3.Decrease FOTO limitation score to <=30% to improve his job task ability. 3 weeks. 5/17/2022    Goal Met 6/21/2022      4.Fair initial written home exercise program knowledge and be without questions to have carryover after discharge from PT. 3 weeks. 5/17/2022    Goal Met 6/21/2022         LTG Weeks/Visits Date Established  Date Met   1.Decrease max bilateral hip pain to 2/10 to improve the patient's quality of life. 6 weeks. 5/17/2022    Goal Met 6/21/2022      2. Increase left hip strength one grade from evaluation date to improve standing activity endurance. 6 weeks. 5/17/2022       In progress 6/21/2022      3.Decrease FOTO limitation score to <=25% to  improve his job task ability. 6 weeks. 5/17/2022    Goal Met 6/21/2022      4 Good progressed  written home exercise program knowledge and be without questions to have carryover after discharge from PT. 6 weeks. 5/17/2022    In progress 6/21/2022      5.Decrease bilateral piriformis and hamstring muscle tone to normal to minimal increase to improve squatting ability. 6 weeks. 5/17/2022    In progress 6/21/2022           PLAN     Plan of care Certification: 5/17/2022 to 07/08/2022. Outpatient Physical Therapy 2 times weekly for 6 weeks. Plan to continue to progress hip stability exercises per patients tolerance and improve bilateral hip mobility.     Buddy Issa, PT

## 2022-06-28 DIAGNOSIS — M70.61 TROCHANTERIC BURSITIS OF BOTH HIPS: Primary | ICD-10-CM

## 2022-06-28 DIAGNOSIS — M70.62 TROCHANTERIC BURSITIS OF BOTH HIPS: Primary | ICD-10-CM
